# Patient Record
Sex: MALE | Race: WHITE
[De-identification: names, ages, dates, MRNs, and addresses within clinical notes are randomized per-mention and may not be internally consistent; named-entity substitution may affect disease eponyms.]

---

## 2021-03-19 ENCOUNTER — HOSPITAL ENCOUNTER (OUTPATIENT)
Dept: HOSPITAL 46 - OPS | Age: 51
Discharge: HOME | End: 2021-03-19
Attending: SURGERY
Payer: COMMERCIAL

## 2021-03-19 VITALS — WEIGHT: 240.52 LBS | BODY MASS INDEX: 33.67 KG/M2 | HEIGHT: 71 IN

## 2021-03-19 DIAGNOSIS — I10: ICD-10-CM

## 2021-03-19 DIAGNOSIS — K62.1: ICD-10-CM

## 2021-03-19 DIAGNOSIS — E78.5: ICD-10-CM

## 2021-03-19 DIAGNOSIS — Z90.49: ICD-10-CM

## 2021-03-19 DIAGNOSIS — R19.7: Primary | ICD-10-CM

## 2021-03-19 DIAGNOSIS — K21.00: ICD-10-CM

## 2021-03-19 PROCEDURE — 0DBM8ZX EXCISION OF DESCENDING COLON, VIA NATURAL OR ARTIFICIAL OPENING ENDOSCOPIC, DIAGNOSTIC: ICD-10-PCS | Performed by: SURGERY

## 2021-03-19 PROCEDURE — 0DBP8ZX EXCISION OF RECTUM, VIA NATURAL OR ARTIFICIAL OPENING ENDOSCOPIC, DIAGNOSTIC: ICD-10-PCS | Performed by: SURGERY

## 2021-03-19 PROCEDURE — 0DBH8ZX EXCISION OF CECUM, VIA NATURAL OR ARTIFICIAL OPENING ENDOSCOPIC, DIAGNOSTIC: ICD-10-PCS | Performed by: SURGERY

## 2021-03-19 PROCEDURE — 0DBL8ZX EXCISION OF TRANSVERSE COLON, VIA NATURAL OR ARTIFICIAL OPENING ENDOSCOPIC, DIAGNOSTIC: ICD-10-PCS | Performed by: SURGERY

## 2021-03-19 PROCEDURE — 0DBN8ZX EXCISION OF SIGMOID COLON, VIA NATURAL OR ARTIFICIAL OPENING ENDOSCOPIC, DIAGNOSTIC: ICD-10-PCS | Performed by: SURGERY

## 2021-03-19 PROCEDURE — G0500 MOD SEDAT ENDO SERVICE >5YRS: HCPCS

## 2021-03-19 NOTE — NUR
03/19/21 0925 Ben Sanchez MD AT BEDSIDE REVIEWING PROCEDURE WITH PT. PT DENIES NAUSEA OR PAIN.
FALLS ASLEEP EASILY